# Patient Record
Sex: FEMALE | Race: WHITE
[De-identification: names, ages, dates, MRNs, and addresses within clinical notes are randomized per-mention and may not be internally consistent; named-entity substitution may affect disease eponyms.]

---

## 2023-10-08 ENCOUNTER — HOSPITAL ENCOUNTER (INPATIENT)
Dept: HOSPITAL 47 - FBPOP | Age: 28
LOS: 1 days | Discharge: HOME | End: 2023-10-09
Attending: OBSTETRICS & GYNECOLOGY | Admitting: OBSTETRICS & GYNECOLOGY
Payer: COMMERCIAL

## 2023-10-08 VITALS — DIASTOLIC BLOOD PRESSURE: 87 MMHG | SYSTOLIC BLOOD PRESSURE: 124 MMHG

## 2023-10-08 DIAGNOSIS — Z88.6: ICD-10-CM

## 2023-10-08 DIAGNOSIS — Z88.0: ICD-10-CM

## 2023-10-08 DIAGNOSIS — K21.9: ICD-10-CM

## 2023-10-08 DIAGNOSIS — Z3A.38: ICD-10-CM

## 2023-10-08 LAB
BASOPHILS # BLD AUTO: 0 K/UL (ref 0–0.2)
BASOPHILS NFR BLD AUTO: 0 %
EOSINOPHIL # BLD AUTO: 0.1 K/UL (ref 0–0.7)
EOSINOPHIL NFR BLD AUTO: 1 %
ERYTHROCYTE [DISTWIDTH] IN BLOOD BY AUTOMATED COUNT: 3.81 M/UL (ref 3.8–5.4)
ERYTHROCYTE [DISTWIDTH] IN BLOOD: 13.4 % (ref 11.5–15.5)
HCT VFR BLD AUTO: 35.2 % (ref 34–46)
HGB BLD-MCNC: 11.7 GM/DL (ref 11.4–16)
LYMPHOCYTES # SPEC AUTO: 1.9 K/UL (ref 1–4.8)
LYMPHOCYTES NFR SPEC AUTO: 19 %
MCH RBC QN AUTO: 30.8 PG (ref 25–35)
MCHC RBC AUTO-ENTMCNC: 33.3 G/DL (ref 31–37)
MCV RBC AUTO: 92.4 FL (ref 80–100)
MONOCYTES # BLD AUTO: 0.5 K/UL (ref 0–1)
MONOCYTES NFR BLD AUTO: 5 %
NEUTROPHILS # BLD AUTO: 7.5 K/UL (ref 1.3–7.7)
NEUTROPHILS NFR BLD AUTO: 74 %
PLATELET # BLD AUTO: 186 K/UL (ref 150–450)
WBC # BLD AUTO: 10.2 K/UL (ref 3.8–10.6)

## 2023-10-08 PROCEDURE — 86850 RBC ANTIBODY SCREEN: CPT

## 2023-10-08 PROCEDURE — 85025 COMPLETE CBC W/AUTO DIFF WBC: CPT

## 2023-10-08 PROCEDURE — 86900 BLOOD TYPING SEROLOGIC ABO: CPT

## 2023-10-08 PROCEDURE — 99213 OFFICE O/P EST LOW 20 MIN: CPT

## 2023-10-08 PROCEDURE — 86901 BLOOD TYPING SEROLOGIC RH(D): CPT

## 2023-10-08 PROCEDURE — 0KQM0ZZ REPAIR PERINEUM MUSCLE, OPEN APPROACH: ICD-10-PCS

## 2023-10-08 PROCEDURE — 59025 FETAL NON-STRESS TEST: CPT

## 2023-10-08 PROCEDURE — 10907ZC DRAINAGE OF AMNIOTIC FLUID, THERAPEUTIC FROM PRODUCTS OF CONCEPTION, VIA NATURAL OR ARTIFICIAL OPENING: ICD-10-PCS

## 2023-10-08 RX ADMIN — ACETAMINOPHEN PRN MG: 325 TABLET, FILM COATED ORAL at 18:51

## 2023-10-08 RX ADMIN — DOCUSATE SODIUM AND SENNOSIDES SCH: 50; 8.6 TABLET ORAL at 21:08

## 2023-10-08 NOTE — P.HPOB
History of Present Illness


H&P Date: 10/08/23


Chief Complaint: labor





28-year-old  presents at 38 weeks and 5 days complaining of contractions 

every 4-5 minutes.  Fetal heart tones 135 with moderate variability and 

reactive.  Her cervix changed from 5 cm to 6 and a meters dilated, 70% effaced, 

and -2 station.





Review of Systems


All systems: negative


Constitutional: Denies chills, Denies fever


Eyes: denies blurred vision, denies pain


Ears, nose, mouth and throat: Denies headache, Denies sore throat


Cardiovascular: Denies chest pain, Denies shortness of breath


Respiratory: Denies cough


Gastrointestinal: Denies abdominal pain, Denies diarrhea, Denies nausea, Denies 

vomiting


Genitourinary: Denies dysuria, Denies hematuria


Musculoskeletal: Denies myalgias


Integumentary: Denies pruritus, Denies rash


Neurological: Denies numbness, Denies weakness


Psychiatric: Denies anxiety, Denies depression


Endocrine: Denies fatigue, Denies weight change





Past Medical History


Past Medical History: GERD/Reflux


Additional Past Medical History / Comment(s): Obstetric history: First pregnancy

was a spontaneous .  Second pregnancy was a normal vaginal delivery.  

This is her third pregnancy she's been followed for a circumvallate placenta.  

Blood type is B+, rubella immune, Pap is B-, RPR nonreactive, HIV nonreactive.


History of Any Multi-Drug Resistant Organisms: None Reported


Past Surgical History: Tonsillectomy


Additional Past Surgical History / Comment(s): wisdom teeth


Past Anesthesia/Blood Transfusion Reactions: No Reported Reaction


Smoking Status: Never smoker





- Past Family History


  ** Father


History Unknown: Yes


Family Medical History: Hypertension





Medications and Allergies


                                Home Medications











 Medication  Instructions  Recorded  Confirmed  Type


 


Citalopram Hydrobromide [CeleXA] 20 mg PO HS 20 History


 


Doxylamine Succinate [Unisom] 25 mg PO HS 20 History


 


Prenatal Vit No.180/Iron/Folic 1 tab PO HS 20 History





[Prenatal Plus Tablet]    


 


Cholecalciferol [Vitamin D3 (25 50 mcg PO HS 22 History





Mcg = 1000 Iu)]    


 


Omeprazole [PriLOSEC] 40 mg PO HS 22 History


 


Sulfamethox-Tmp 800-160Mg [Bactrim 1 each PO Q12HR 3 Days #6 tab 22  Rx





Ds]    








                                    Allergies











Allergy/AdvReac Type Severity Reaction Status Date / Time


 


ibuprofen [From Motrin] Allergy  Anaphylaxis Verified 10/01/23 12:04


 


Penicillins Allergy  Nausea & Verified 10/01/23 12:04





   Vomiting  














Exam


Osteopathic Statement: *.  No significant issues noted on an osteopathic 

structural exam other than those noted in the History and Physical/Consult.


                                Intake and Output











 10/07/23 10/08/23 10/08/23





 22:59 06:59 14:59


 


Other:   


 


  Weight   89.811 kg














Heart: Regular rate and rhythm


Lungs: Clear to auscultation bilaterally


Abdomen: Soft, nontender


Extremities: Negative Homans sign





Assessment and Plan


(1) Normal labor


Current Visit: Yes   Status: Acute   Code(s): O80 - ENCOUNTER FOR FULL-TERM 

UNCOMPLICATED DELIVERY; Z37.9 - OUTCOME OF DELIVERY, UNSPECIFIED   SNOMED 

Code(s): 46657094


   


Plan: 





1. admit to [facility]


2. expectant management


3. anticipate normal vaginal delivery

## 2023-10-08 NOTE — P.MSEPDOC
Presenting Problems





- Arrival Data


Date of Arrival on Unit: 10/08/23


Time of Arrival on Unit: 08:30


Mode of Transport: Ambulatory





- Complaint


OB-Reason for Admission/Chief Complaint: Possible Onset of Labor


Comment: Contrx every 4-7 min





Prenatal Medical History





- Pregnancy Information


: 2


Para: 1


Term: 1


: 0


Abortions: Spontaneous or Elective: 0


Number of Living Children: 1





- Gestational Age


Gestational Age by JACIEL (wks/days): 38 Weeks and 5 Days





Review of Systems





- Review of Systems


Constitutional: No problems


Breast: No problems


ENT: No problems


Cardiovascular: No problems


Respiratory: No problems


Gastrointestinal: No problems


Genitourinary: No problems


Musculoskeletal: No problems


Neurological: No problems


Skin: No problems





Vital Signs





- Temperature


Temperature: 98.1 F


Temperature Source: Temporal Artery Scan





- Pulse


  ** Right Brachial


Pulse Rate: 94


Pulse Assessment Method: Automatic Cuff





- Respirations


Respiratory Rate: 16


Oxygen Delivery Method: Room Air





- Blood Pressure


  ** Right Arm


Blood Pressure: 118/72


Blood Pressure Mean: 87


Blood Pressure Source: Automatic Cuff





Medical Screen Scoring





- Cervical Exam


Dilation (cm): 6


Effacement (%): 70


Station: -2


Membranes: Intact





- Uterine Contractions


Frequency From (mins): 4


Frequency To (mins): 7


Duration From (seconds): 50


Duration To (seconds): 70


Intensity: Moderate


Resting: Soft to palpation





- Fetal Assessment - Baby A


Baseline FHR: 145


Fetal Heart Rate - NICHD Category: Category I (Normal)


NST: Reactive





Physician Notification





- Physician Notified


Physician Notified Date: 10/08/23


Physician Notified Time: 09:40


Physician: Dr Bhatia


New Order Received: Yes





- Notification Comment


Comment: Dr Bhatia present and ordered to admit patient





Maternal Fetal Triage Index





- Maternal Fetal Triage Index


Presenting for scheduled procedure w/no complaint: No





- Stat/Priority 1


Stat Priority 1: No





- Urgent/Priority 2


Urgent Priority 2: No





- Prompt/Priority 3


Prompt Priority 3: No





- Non-Urgent/Priority 4


Non-Urgent Priority 4: Yes


Criteria Met for Priority 4: Dr Bhatia present





Disposition





- Disposition


OB Disposition: Admit


I agree with the RN Medical Screening Exam: Yes


Case reviewed; plan agreed upon as documented in EMR&OBIX.: Yes


Diagnosis: ENCOUNTER FOR FULL-TERM UNCOMPLICATED DELIVERY

## 2023-10-09 VITALS — TEMPERATURE: 98.2 F | HEART RATE: 123 BPM | RESPIRATION RATE: 40 BRPM

## 2023-10-09 LAB
BASOPHILS # BLD AUTO: 0 K/UL (ref 0–0.2)
BASOPHILS NFR BLD AUTO: 0 %
EOSINOPHIL # BLD AUTO: 0 K/UL (ref 0–0.7)
EOSINOPHIL NFR BLD AUTO: 0 %
ERYTHROCYTE [DISTWIDTH] IN BLOOD BY AUTOMATED COUNT: 3.42 M/UL (ref 3.8–5.4)
ERYTHROCYTE [DISTWIDTH] IN BLOOD: 13.5 % (ref 11.5–15.5)
HCT VFR BLD AUTO: 32.2 % (ref 34–46)
HGB BLD-MCNC: 10.5 GM/DL (ref 11.4–16)
LYMPHOCYTES # SPEC AUTO: 1.8 K/UL (ref 1–4.8)
LYMPHOCYTES NFR SPEC AUTO: 17 %
MCH RBC QN AUTO: 30.6 PG (ref 25–35)
MCHC RBC AUTO-ENTMCNC: 32.6 G/DL (ref 31–37)
MCV RBC AUTO: 94.1 FL (ref 80–100)
MONOCYTES # BLD AUTO: 0.5 K/UL (ref 0–1)
MONOCYTES NFR BLD AUTO: 4 %
NEUTROPHILS # BLD AUTO: 8.4 K/UL (ref 1.3–7.7)
NEUTROPHILS NFR BLD AUTO: 77 %
PLATELET # BLD AUTO: 181 K/UL (ref 150–450)
WBC # BLD AUTO: 10.9 K/UL (ref 3.8–10.6)

## 2023-10-09 RX ADMIN — DOCUSATE SODIUM AND SENNOSIDES SCH EACH: 50; 8.6 TABLET ORAL at 08:28

## 2023-10-09 RX ADMIN — ACETAMINOPHEN PRN MG: 325 TABLET, FILM COATED ORAL at 03:06

## 2023-10-09 NOTE — P.DS
Providers


Date of admission: 


10/08/23 09:41





Expected date of discharge: 10/09/23


Attending physician: 


Iris Bhatia





Primary care physician: 


Stated None








- Discharge Diagnosis(es)


(1) Normal labor


Current Visit: Yes   Status: Resolved   





(2) Normal vaginal delivery


Current Visit: Yes   Status: Acute   


Hospital Course: 





Patient presented in active labor.  She underwent a normal vaginal delivery.  

Postpartum course was uneventful.  She denies nausea, vomiting, chest pain, 

shortness of breath or calf pain.  Patient will be discharged home postpartum 

day #1 in stable condition to follow-up with me in 6 weeks.





Plan - Discharge Summary


Discharge Rx Participant: No


New Discharge Prescriptions: 


No Action


   Citalopram Hydrobromide [CeleXA] 20 mg PO HS


   Prenatal Vit No.180/Iron/Folic [Prenatal Plus Tablet] 1 tab PO HS


   Doxylamine Succinate [Unisom] 25 mg PO HS


   Cholecalciferol [Vitamin D3 (25 Mcg = 1000 Iu)] 50 mcg PO HS


   Omeprazole [PriLOSEC] 40 mg PO HS


   Sulfamethox-Tmp 800-160Mg [Bactrim Ds] 1 each PO Q12HR 3 Days #6 tab


Discharge Medication List





Citalopram Hydrobromide [CeleXA] 20 mg PO HS 05/25/20 [History]


Doxylamine Succinate [Unisom] 25 mg PO HS 05/25/20 [History]


Prenatal Vit No.180/Iron/Folic [Prenatal Plus Tablet] 1 tab PO HS 05/25/20 

[History]


Cholecalciferol [Vitamin D3 (25 Mcg = 1000 Iu)] 50 mcg PO HS 02/07/22 [History]


Omeprazole [PriLOSEC] 40 mg PO HS 02/07/22 [History]


Sulfamethox-Tmp 800-160Mg [Bactrim Ds] 1 each PO Q12HR 3 Days #6 tab 02/07/22 

[Rx]








Follow up Appointment(s)/Referral(s): 


Iris Bhatia DO [Doctor of Osteopathic Medicine] - 6 Weeks


Discharge Disposition: HOME SELF-CARE

## 2023-10-09 NOTE — P.PROBDLV
Vaginal Delivery Note





- .


Vaginal Delivery Note: 





28-year-old  presents at 38 weeks and 5 days complaining of contractions 

every 4-5 minutes.  Fetal heart tones 135 with moderate variability and 

reactive.  Her cervix changed from 5 cm to 6 and a meters dilated, 70% effaced, 

and -2 station.  Amniotomy performed and clear fluid noted at 11:23 AM.  Patient

progressed to complete, pushed and delivered a viable male infant over intact 

perineum at 12:44 PM.  Head delivered OA, anterior shoulder delivered gentle 

downward guidance for by posterior shoulder and rest of body.  Nose and mouth 

bulb suctioned, cord clamped and cut, infant placed mother's abdomen.  Apgars 7,

8, weight 7 pounds 2.3 ounces.  Placenta delivered spontaneous, intact with 

three-vessel cord at 12:47 PM.  Vagina, cervix, perineum inspected.  Second-

degree midline laceration was repaired with 3-0 Vicryl.  Estimated blood loss 

150 mL.  Mother and baby in stable condition.